# Patient Record
Sex: FEMALE | Race: WHITE | Employment: FULL TIME | URBAN - NONMETROPOLITAN AREA
[De-identification: names, ages, dates, MRNs, and addresses within clinical notes are randomized per-mention and may not be internally consistent; named-entity substitution may affect disease eponyms.]

---

## 2020-12-31 ENCOUNTER — APPOINTMENT (OUTPATIENT)
Dept: GENERAL RADIOLOGY | Age: 50
End: 2020-12-31
Payer: COMMERCIAL

## 2020-12-31 ENCOUNTER — HOSPITAL ENCOUNTER (EMERGENCY)
Age: 50
Discharge: HOME | End: 2020-12-31
Attending: EMERGENCY MEDICINE
Payer: COMMERCIAL

## 2020-12-31 VITALS
SYSTOLIC BLOOD PRESSURE: 136 MMHG | RESPIRATION RATE: 20 BRPM | HEART RATE: 93 BPM | DIASTOLIC BLOOD PRESSURE: 75 MMHG | OXYGEN SATURATION: 93 % | TEMPERATURE: 98.3 F

## 2020-12-31 DIAGNOSIS — R07.89 ATYPICAL CHEST PAIN: ICD-10-CM

## 2020-12-31 DIAGNOSIS — K21.9 GASTROESOPHAGEAL REFLUX DISEASE, UNSPECIFIED WHETHER ESOPHAGITIS PRESENT: Primary | ICD-10-CM

## 2020-12-31 LAB
ALBUMIN SERPL-MCNC: 4.5 G/DL (ref 3.5–5.1)
ALP BLD-CCNC: 110 U/L (ref 38–126)
ALT SERPL-CCNC: 54 U/L (ref 11–66)
ANION GAP SERPL CALCULATED.3IONS-SCNC: 12 MEQ/L (ref 8–16)
AST SERPL-CCNC: 32 U/L (ref 5–40)
BASOPHILS # BLD: 0.1 %
BASOPHILS ABSOLUTE: 0 THOU/MM3 (ref 0–0.1)
BILIRUB SERPL-MCNC: 0.4 MG/DL (ref 0.3–1.2)
BILIRUBIN DIRECT: < 0.2 MG/DL (ref 0–0.3)
BUN BLDV-MCNC: 14 MG/DL (ref 7–22)
CALCIUM SERPL-MCNC: 9.8 MG/DL (ref 8.5–10.5)
CHLORIDE BLD-SCNC: 105 MEQ/L (ref 98–111)
CO2: 26 MEQ/L (ref 23–33)
CREAT SERPL-MCNC: 0.6 MG/DL (ref 0.4–1.2)
EKG ATRIAL RATE: 91 BPM
EKG P AXIS: 54 DEGREES
EKG P-R INTERVAL: 164 MS
EKG Q-T INTERVAL: 378 MS
EKG QRS DURATION: 72 MS
EKG QTC CALCULATION (BAZETT): 464 MS
EKG R AXIS: 2 DEGREES
EKG T AXIS: 24 DEGREES
EKG VENTRICULAR RATE: 91 BPM
EOSINOPHIL # BLD: 2.5 %
EOSINOPHILS ABSOLUTE: 0.2 THOU/MM3 (ref 0–0.4)
ERYTHROCYTE [DISTWIDTH] IN BLOOD BY AUTOMATED COUNT: 13 % (ref 11.5–14.5)
ERYTHROCYTE [DISTWIDTH] IN BLOOD BY AUTOMATED COUNT: 40.6 FL (ref 35–45)
GFR SERPL CREATININE-BSD FRML MDRD: > 90 ML/MIN/1.73M2
GLUCOSE BLD-MCNC: 121 MG/DL (ref 70–108)
HCT VFR BLD CALC: 46.3 % (ref 37–47)
HEMOGLOBIN: 15.1 GM/DL (ref 12–16)
IMMATURE GRANS (ABS): 0.02 THOU/MM3 (ref 0–0.07)
IMMATURE GRANULOCYTES: 0.2 %
LIPASE: 30.8 U/L (ref 5.6–51.3)
LYMPHOCYTES # BLD: 17.2 %
LYMPHOCYTES ABSOLUTE: 1.5 THOU/MM3 (ref 1–4.8)
MAGNESIUM: 2.3 MG/DL (ref 1.6–2.4)
MCH RBC QN AUTO: 28.2 PG (ref 26–33)
MCHC RBC AUTO-ENTMCNC: 32.6 GM/DL (ref 32.2–35.5)
MCV RBC AUTO: 86.5 FL (ref 81–99)
MONOCYTES # BLD: 5.6 %
MONOCYTES ABSOLUTE: 0.5 THOU/MM3 (ref 0.4–1.3)
NUCLEATED RED BLOOD CELLS: 0 /100 WBC
OSMOLALITY CALCULATION: 286.7 MOSMOL/KG (ref 275–300)
PLATELET # BLD: 294 THOU/MM3 (ref 130–400)
PMV BLD AUTO: 9.2 FL (ref 9.4–12.4)
POTASSIUM SERPL-SCNC: 3.5 MEQ/L (ref 3.5–5.2)
PRO-BNP: 54.3 PG/ML (ref 0–900)
RBC # BLD: 5.35 MILL/MM3 (ref 4.2–5.4)
SEG NEUTROPHILS: 74.4 %
SEGMENTED NEUTROPHILS ABSOLUTE COUNT: 6.6 THOU/MM3 (ref 1.8–7.7)
SODIUM BLD-SCNC: 143 MEQ/L (ref 135–145)
TOTAL PROTEIN: 7.1 G/DL (ref 6.1–8)
TROPONIN T: < 0.01 NG/ML
TROPONIN T: < 0.01 NG/ML
WBC # BLD: 8.9 THOU/MM3 (ref 4.8–10.8)

## 2020-12-31 PROCEDURE — 83880 ASSAY OF NATRIURETIC PEPTIDE: CPT

## 2020-12-31 PROCEDURE — 83735 ASSAY OF MAGNESIUM: CPT

## 2020-12-31 PROCEDURE — 80053 COMPREHEN METABOLIC PANEL: CPT

## 2020-12-31 PROCEDURE — 71045 X-RAY EXAM CHEST 1 VIEW: CPT

## 2020-12-31 PROCEDURE — 6370000000 HC RX 637 (ALT 250 FOR IP): Performed by: EMERGENCY MEDICINE

## 2020-12-31 PROCEDURE — 36415 COLL VENOUS BLD VENIPUNCTURE: CPT

## 2020-12-31 PROCEDURE — 99285 EMERGENCY DEPT VISIT HI MDM: CPT

## 2020-12-31 PROCEDURE — 93005 ELECTROCARDIOGRAM TRACING: CPT | Performed by: EMERGENCY MEDICINE

## 2020-12-31 PROCEDURE — 83690 ASSAY OF LIPASE: CPT

## 2020-12-31 PROCEDURE — 82248 BILIRUBIN DIRECT: CPT

## 2020-12-31 PROCEDURE — 84484 ASSAY OF TROPONIN QUANT: CPT

## 2020-12-31 PROCEDURE — 85025 COMPLETE CBC W/AUTO DIFF WBC: CPT

## 2020-12-31 RX ORDER — CALCIUM CARBONATE 200(500)MG
1 TABLET,CHEWABLE ORAL DAILY
Qty: 30 TABLET | Refills: 0 | Status: SHIPPED | OUTPATIENT
Start: 2020-12-31 | End: 2020-12-31 | Stop reason: SDUPTHER

## 2020-12-31 RX ORDER — ONDANSETRON 4 MG/1
4 TABLET, ORALLY DISINTEGRATING ORAL ONCE
Status: COMPLETED | OUTPATIENT
Start: 2020-12-31 | End: 2020-12-31

## 2020-12-31 RX ORDER — ASPIRIN 81 MG/1
324 TABLET, CHEWABLE ORAL ONCE
Status: COMPLETED | OUTPATIENT
Start: 2020-12-31 | End: 2020-12-31

## 2020-12-31 RX ORDER — OMEPRAZOLE 40 MG/1
40 CAPSULE, DELAYED RELEASE ORAL
Qty: 30 CAPSULE | Refills: 0 | Status: SHIPPED | OUTPATIENT
Start: 2020-12-31 | End: 2021-01-30

## 2020-12-31 RX ORDER — HYDROCODONE BITARTRATE AND ACETAMINOPHEN 5; 325 MG/1; MG/1
1 TABLET ORAL EVERY 6 HOURS PRN
Qty: 12 TABLET | Refills: 0 | Status: SHIPPED | OUTPATIENT
Start: 2020-12-31 | End: 2020-12-31

## 2020-12-31 RX ORDER — CALCIUM CARBONATE 200(500)MG
1 TABLET,CHEWABLE ORAL DAILY
Qty: 30 TABLET | Refills: 0 | Status: SHIPPED | OUTPATIENT
Start: 2020-12-31 | End: 2021-01-30

## 2020-12-31 RX ORDER — OMEPRAZOLE 40 MG/1
40 CAPSULE, DELAYED RELEASE ORAL
Qty: 30 CAPSULE | Refills: 0 | Status: SHIPPED | OUTPATIENT
Start: 2020-12-31 | End: 2020-12-31 | Stop reason: SDUPTHER

## 2020-12-31 RX ORDER — PANTOPRAZOLE SODIUM 40 MG/1
40 TABLET, DELAYED RELEASE ORAL ONCE
Status: COMPLETED | OUTPATIENT
Start: 2020-12-31 | End: 2020-12-31

## 2020-12-31 RX ADMIN — ONDANSETRON 4 MG: 4 TABLET, ORALLY DISINTEGRATING ORAL at 04:17

## 2020-12-31 RX ADMIN — LIDOCAINE HYDROCHLORIDE: 20 SOLUTION ORAL; TOPICAL at 04:17

## 2020-12-31 RX ADMIN — ASPIRIN 324 MG: 81 TABLET, CHEWABLE ORAL at 04:17

## 2020-12-31 RX ADMIN — PANTOPRAZOLE SODIUM 40 MG: 40 TABLET, DELAYED RELEASE ORAL at 04:17

## 2020-12-31 ASSESSMENT — PAIN DESCRIPTION - LOCATION: LOCATION: CHEST

## 2020-12-31 ASSESSMENT — PAIN SCALES - GENERAL: PAINLEVEL_OUTOF10: 3

## 2020-12-31 NOTE — ED PROVIDER NOTES
[facility]  eMERGENCY dEPARTMENT eNCOUnter          CHIEF COMPLAINT       Chief Complaint   Patient presents with    Chest Pain       Nurses Notes reviewed and I agree except as noted in the HPI. HISTORY OF PRESENT ILLNESS    Peng Emerson is a 44 y.o. female who presents with chest pain. Apparently the patient was asleep, she woke up she went to the bathroom she laid back down she started having substernal chest pain. It does not radiate or refer. Does worsen when she takes a deep breath. She did not really awaken with the pain. She states she has no cardiac issues. She is not taking any medications. Pain does not shoot through straight to her back. Does not refer to her throat or to her left arm. Patient states that she last ate at 6 PM last evening having leftovers. Nothing out of the ordinary. Patient just traveled here from [place] and she is visiting her mother. Currently the patient is resting comfortably on the cot in no apparent distress hemodynamically stable. REVIEW OF SYSTEMS     Review of Systems   Constitutional: Negative for activity change, appetite change, diaphoresis, fatigue and unexpected weight change. HENT: Negative for congestion, ear discharge, ear pain, hearing loss, rhinorrhea, sinus pressure, sore throat, trouble swallowing and voice change. Eyes: Negative for photophobia, pain, discharge, redness and itching. Respiratory: Negative for cough, choking, chest tightness, shortness of breath and wheezing. Cardiovascular: Positive for chest pain. Negative for palpitations and leg swelling. Gastrointestinal: Negative for abdominal distention, abdominal pain, blood in stool, constipation, diarrhea, nausea and vomiting. Endocrine: Negative for polydipsia, polyphagia and polyuria. Genitourinary: Negative for decreased urine volume, difficulty urinating, dysuria, enuresis, frequency, hematuria and urgency.    Musculoskeletal: Negative for arthralgias, back pain, gait problem, myalgias, neck pain and neck stiffness. Skin: Negative for pallor and rash. Allergic/Immunologic: Negative for immunocompromised state. Neurological: Negative for dizziness, tremors, seizures, syncope, facial asymmetry, weakness, light-headedness, numbness and headaches. Hematological: Negative for adenopathy. Does not bruise/bleed easily. Psychiatric/Behavioral: Negative for agitation, hallucinations and suicidal ideas. The patient is not nervous/anxious. PAST MEDICAL HISTORY    has no past medical history on file. SURGICAL HISTORY      has no past surgical history on file. CURRENT MEDICATIONS       Discharge Medication List as of 12/31/2020  7:06 AM          ALLERGIES     has No Known Allergies. FAMILY HISTORY     has no family status information on file. family history is not on file. SOCIAL HISTORY      reports that she has never smoked. She does not have any smokeless tobacco history on file. She reports previous alcohol use. She reports that she does not use drugs. PHYSICAL EXAM     INITIAL VITALS:  oral temperature is 98.3 °F (36.8 °C). Her blood pressure is 136/75 and her pulse is 93. Her respiration is 20 and oxygen saturation is 93%. Physical Exam  Vitals signs and nursing note reviewed. Constitutional:       General: She is not in acute distress. Appearance: She is well-developed. She is not diaphoretic. HENT:      Head: Normocephalic and atraumatic. Right Ear: External ear normal.      Left Ear: External ear normal.      Nose: Nose normal.      Mouth/Throat:      Pharynx: No oropharyngeal exudate. Eyes:      General: No scleral icterus. Right eye: No discharge. Left eye: No discharge. Conjunctiva/sclera: Conjunctivae normal.      Pupils: Pupils are equal, round, and reactive to light. Neck:      Musculoskeletal: Normal range of motion and neck supple. Thyroid: No thyromegaly. normal.   Psychiatric:         Mood and Affect: Mood normal.         Speech: Speech normal.         Behavior: Behavior normal.         Thought Content: Thought content normal.         Judgment: Judgment normal.           DIFFERENTIAL DIAGNOSIS:   Chest pain ACS, GERD gastritis gastroenteritis pneumonia bronchitis. DIAGNOSTIC RESULTS     EKG: All EKG's are interpreted by the Emergency Department Physician who either signs or Co-signs this chart in the absence of a cardiologist.  EKG revealed normal sinus rhythm, normal axis, ventricular rate of 91 OH interval 164 QRS duration 72 QT interval 378 QTC of 464. RADIOLOGY: non-plain film images(s) such as CT, Ultrasound and MRI are read by the radiologist.  XR CHEST PORTABLE   Final Result   Impression:   No focal consolidation. There is mild nonspecific bronchial wall thickening. This document has been electronically signed by: Ai Moy MD on    12/31/2020 04:19 AM               LABS:   Labs Reviewed   CBC WITH AUTO DIFFERENTIAL - Abnormal; Notable for the following components:       Result Value    MPV 9.2 (*)     All other components within normal limits   BASIC METABOLIC PANEL - Abnormal; Notable for the following components:    Glucose 121 (*)     All other components within normal limits   BRAIN NATRIURETIC PEPTIDE   HEPATIC FUNCTION PANEL   LIPASE   TROPONIN   MAGNESIUM   ANION GAP   GLOMERULAR FILTRATION RATE, ESTIMATED   OSMOLALITY   TROPONIN       EMERGENCY DEPARTMENT COURSE:   Vitals:    Vitals:    12/31/20 0401 12/31/20 0427 12/31/20 0509 12/31/20 0617   BP: (!) 169/110  (!) 143/76 136/75   Pulse: 98 86 85 93   Resp: 20 20 20 20   Temp: 98.3 °F (36.8 °C)      TempSrc: Oral      SpO2: 98% 95% 93% 93%     Patient was assessed at bedside appropriate labs and imaging were ordered. EKG looks good although there is possibly some elevation in V1 and aVR.   In any event the patient will be given aspirin I will also attempt to treat a reflux disease with Zofran Protonix and a GI cocktail. I reviewed all labs and imaging went back to reassess the patient. Patient was doing much better complete relief of symptoms. Second set of troponins were ordered. This is also negative. Patient is up walking around the room. She states she is ready to go. At this point I feel that there is high likelihood that the patient suffered from a attack of GERD. Patient will be given prescriptions for medications for her GERD. She is instructed to take those as prescribed. She is instructed to follow-up with her primary care physician when she comes home, or to return to the nearest emergency room immediately for any new or worsening complaints. Patient and  at bedside understood and agreed with the plan. Patient is subsequently discharged home in stable condition. Patient has what appears to be atypical chest pain or more likely GERD. Patient has been given medications for this she is instructed to take it as prescribed. She is instructed to follow-up with her primary care physician when she gets back in town or return to the nearest emergency room immediately for any new or worsening complaints. CRITICAL CARE:   None    CONSULTS:  None    PROCEDURES:  None    FINAL IMPRESSION      1. Gastroesophageal reflux disease, unspecified whether esophagitis present    2.  Atypical chest pain          DISPOSITION/PLAN   Discharge    PATIENT REFERRED TO:  Your Primary care Physician    Call in 1 day        DISCHARGE MEDICATIONS:  Discharge Medication List as of 12/31/2020  7:06 AM          (Please note that portions of this note were completed with a voice recognition program.  Efforts were made to edit the dictations but occasionally words are mis-transcribed.)    DO Cabrera Contreras, DO  12/31/20 [address] [place] [address], DO  01/04/21 [facility], DO  01/04/21 [facility]

## 2020-12-31 NOTE — ED TRIAGE NOTES
Pt presents to the ED from home c/o chest pain in the middle that started around 0200 today. Pt states that she has a family hx of heart attacks but has no hx herself. Pt denies being on BP meds. Pt describes pain as when walking out in cold air and the air is in your lungs, but it is in chest\". Pt states she has had pneumonia. Pt respirations easy and unlabored. Pt rates pain 3/10. EKG complete.

## 2020-12-31 NOTE — ED NOTES
Pt up and ambulating to bathroom at this time. Pt respirations easy and unlabored. Pt denies any further needs at this time.       Rashaun Virgen RN  12/31/20 0026

## 2021-01-04 ASSESSMENT — ENCOUNTER SYMPTOMS
BLOOD IN STOOL: 0
EYE PAIN: 0
BACK PAIN: 0
CONSTIPATION: 0
EYE ITCHING: 0
SHORTNESS OF BREATH: 0
NAUSEA: 0
VOICE CHANGE: 0
EYE REDNESS: 0
ABDOMINAL DISTENTION: 0
COUGH: 0
EYE DISCHARGE: 0
DIARRHEA: 0
RHINORRHEA: 0
CHOKING: 0
CHEST TIGHTNESS: 0
SINUS PRESSURE: 0
SORE THROAT: 0
WHEEZING: 0
PHOTOPHOBIA: 0
TROUBLE SWALLOWING: 0
ABDOMINAL PAIN: 0
VOMITING: 0